# Patient Record
Sex: MALE | Race: OTHER | HISPANIC OR LATINO | URBAN - METROPOLITAN AREA
[De-identification: names, ages, dates, MRNs, and addresses within clinical notes are randomized per-mention and may not be internally consistent; named-entity substitution may affect disease eponyms.]

---

## 2019-09-25 ENCOUNTER — EMERGENCY (EMERGENCY)
Facility: HOSPITAL | Age: 24
LOS: 1 days | Discharge: HOME | End: 2019-09-25
Attending: EMERGENCY MEDICINE | Admitting: EMERGENCY MEDICINE
Payer: SELF-PAY

## 2019-09-25 VITALS
RESPIRATION RATE: 18 BRPM | HEART RATE: 96 BPM | OXYGEN SATURATION: 97 % | SYSTOLIC BLOOD PRESSURE: 125 MMHG | TEMPERATURE: 98 F | DIASTOLIC BLOOD PRESSURE: 67 MMHG

## 2019-09-25 VITALS
DIASTOLIC BLOOD PRESSURE: 68 MMHG | HEART RATE: 84 BPM | RESPIRATION RATE: 18 BRPM | TEMPERATURE: 98 F | OXYGEN SATURATION: 98 % | SYSTOLIC BLOOD PRESSURE: 122 MMHG

## 2019-09-25 PROCEDURE — 71101 X-RAY EXAM UNILAT RIBS/CHEST: CPT | Mod: 26,LT

## 2019-09-25 PROCEDURE — 71101 X-RAY EXAM UNILAT RIBS/CHEST: CPT

## 2019-09-25 PROCEDURE — 99285 EMERGENCY DEPT VISIT HI MDM: CPT

## 2019-09-25 PROCEDURE — 82962 GLUCOSE BLOOD TEST: CPT

## 2019-09-25 PROCEDURE — 99284 EMERGENCY DEPT VISIT MOD MDM: CPT

## 2019-09-25 RX ORDER — ACETAMINOPHEN 500 MG
975 TABLET ORAL ONCE
Refills: 0 | Status: COMPLETED | OUTPATIENT
Start: 2019-09-25 | End: 2019-09-25

## 2019-09-25 RX ADMIN — Medication 975 MILLIGRAM(S): at 18:39

## 2019-09-25 RX ADMIN — Medication 975 MILLIGRAM(S): at 17:56
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
## 2019-09-25 NOTE — ED ADULT NURSE REASSESSMENT NOTE - NS ED NURSE REASSESS COMMENT FT1
MISAEL Landaverde at bedside for re-evaluation, pt. asking for belongings, retrieved and given to pt., pt. getting dressed

## 2019-09-25 NOTE — ED ADULT NURSE NOTE - NSFALLRSKASSESSTYPE_ED_ALL_ED
Patient notified.     24 hour urine test canceled.     He has not been taking Triam/HCTZ. Removed from med list and added to allergy list.    Initial (On Arrival)
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
## 2019-09-25 NOTE — ED ADULT NURSE REASSESSMENT NOTE - NS ED NURSE REASSESS COMMENT FT1
pt. ambulating about ED, with steady gait, walked to restroom, provider notified, dispo. in progress

## 2019-09-25 NOTE — ED PROVIDER NOTE - NSFOLLOWUPINSTRUCTIONS_ED_ALL_ED_FT
Alcohol Intoxication  Alcohol intoxication occurs when a person no longer thinks clearly or functions well (becomes impaired) after drinking alcohol. Intoxication can occur with just one drink. The legal definition of alcohol intoxication depends on the amount of alcohol in the blood (blood alcohol concentration, FARZANA). FARZANA of 80–100 mg/dL or higher is commonly considered legally intoxicated. The level of impairment depends on:  The amount of alcohol the person had.  The person's age, gender, and weight.  How often the person drinks.  Whether the person has other medical conditions, such as diabetes, seizures, or a heart condition.  Alcohol intoxication can range from mild to severe. The condition can be dangerous, especially if the person:  Also took certain drugs or prescription medicines.  Drinks a large amount of alcohol in a short period of time (binge-drinks).  For women, binge-drinking is having four or more drinks at one time.  For men, binge-drinking is having five or more drinks at one time.  What are the causes?  This condition is caused by drinking alcohol.    What are the signs or symptoms?  Alcohol affects everyone differently. Symptoms of alcohol intoxication can vary among people.    Symptoms of mild alcohol intoxication may include:  Feeling relaxed or sleepy.  Having mild difficulty with coordination, speech, memory, or attention.  Symptoms of moderate alcohol intoxication may include:  Extreme emotions, like anger or sadness.  Moderate difficulty with coordination, speech, memory, or attention.  Symptoms of severe alcohol intoxication may include:  Severe difficulty with coordination, speech, memory, or attention.  Passing out.  Vomiting.  Confusion.  Slow breathing.  Coma.  Intoxication can change quickly from mild to severe. It can cause coma or death, especially in people who are not exposed to alcohol often.    How is this diagnosed?  Your health care provider will ask you how much alcohol you drank and what kind you had. Intoxication may also be diagnosed based on:  Your symptoms and medical history.  A physical exam.  A blood test that measures FARZANA.  Whether there is a smell of alcohol on your breath.  How is this treated?  Treatment for alcohol intoxication may include:  Being monitored in an emergency department, hospital, or treatment center until your blood alcohol level starts to lower and it is safe for you to go home.  IV fluids to prevent or treat dehydration.  Medicine to treat nausea or vomiting.  Medicine to help your body get rid of the alcohol.  A short counseling session (brief intervention) about the dangers of using alcohol excessively.  Working with (referral to) a counselor or specialist for treatment of substance use disorder.   Substance use disorder is a condition in which repeated use of drugs or alcohol causes problems with mental and physical health, affects relationships, or affects one's ability to meet responsibilities at work, home, or school.  Oxygen therapy or a breathing machine (ventilator), if you are not breathing adequately.  Long-term (chronic) exposure to alcohol can have long-term effects on your brain, heart, and gastrointestinal system. These effects can be serious and may also require treatment.    Follow these instructions at home:  Image   Eating and drinking     Image   Ask your health care provider if alcohol is safe for you.  If your health care provider says that alcohol is safe for you, limit alcohol intake to no more than 1 drink a day for nonpregnant women and 2 drinks a day for men. One drink equals 12 oz of beer, 5 oz of wine, or 1½ oz of hard liquor.  Do not drink alcohol if:  You are under the legal drinking age (21 years in the [place]  You are pregnant or may be pregnant.  You are taking medicines that should not be taken with alcohol.  Alcohol causes your medical condition to get worse.  You need to drive or perform activities that require your attention.  You have substance use disorder.  Avoid drinking alcohol on an empty stomach.  Stay hydrated. Drink enough fluid to keep your urine pale yellow. Avoid caffeine because it can dehydrate you.  Avoid drinking more than one drink per hour.  When having multiple drinks, drink water or a non-alcoholic beverage between alcoholic drinks.  General instructions     Do not drive after drinking any amount of alcohol. Plan for a designated  or another way to go home.  Have someone responsible stay with you while you are intoxicated. You should not be left alone.  Take over-the-counter and prescription medicines only as told by your health care provider.  Contact a health care provider if:  You do not feel better after a few days.  You have problems at work, at school, or at home due to drinking.  Get help right away if:  You have any of the following:  Moderate to severe trouble with coordination, speech, memory, or attention.  Trouble staying awake.  Severe confusion.  A seizure.  Light-headedness.  Fainting.  Vomiting bright red blood or material that looks like coffee grounds.  Bloody stool (feces). The blood may be bright red, or it may make stool look black and tarry and make it smell bad.  Shakiness when trying to stop drinking.  Thoughts about hurting yourself or others.  If you or anyone around you appears intoxicated, speak up and act.    If you ever feel like you may hurt yourself or others, or have thoughts about taking your own life, get help right away. You can go to your nearest emergency department or call:   Your local emergency services (911 in the [place]   A suicide crisis helpline, such as the National Suicide Prevention Lifeline at 1-110.315.5624. This is open 24 hours a day.   Summary  Alcohol intoxication occurs when a person no longer thinks clearly or functions well (becomes impaired) after drinking alcohol.  If your health care provider says that alcohol is safe for you, limit alcohol intake to no more than 1 drink a day for women (no drinks if you are pregnant) and 2 drinks a day for men. One drink equals 12 oz of beer, 5 oz of wine, or 1½ oz of hard liquor.  Contact your health care provider if you are having problems at work, at school, or at home due to drinking.   Get help right away if you have thoughts about hurting yourself or others.  This information is not intended to replace advice given to you by your health care provider. Make sure you discuss any questions you have with your health care provider.    Document Released: 09/27/2006 Document Revised: 10/03/2018 Document Reviewed: 10/03/2018  Elsevier Interactive Patient Education © 2019 [employer]

## 2019-09-25 NOTE — ED PROVIDER NOTE - SKIN, MLM
Left 8th rib interior mid axillary pain. Tenderness to palpation. No ecchymosis, no swelling, no abrasion, no discoloration Left 8th rib interior mid axillary pain. Mild Tenderness to palpation. No ecchymosis, no swelling, no abrasion, no discoloration

## 2019-09-25 NOTE — ED PROVIDER NOTE - PROGRESS NOTE DETAILS
Saima: patient has no complaints.  alert and oriented x 3, speech clear, gait steady.  Patient requesting DC and asking to use phone to call his family.

## 2019-09-25 NOTE — ED PROVIDER NOTE - PATIENT PORTAL LINK FT
You can access the FollowMyHealth Patient Portal offered by [facility] by registering at the following website: [email or web address]. By joining [employer] [facility]’s FollowMyHealth portal, you will also be able to view your health information using other applications (apps) compatible with our system.

## 2019-09-25 NOTE — ED ADULT NURSE NOTE - NSIMPLEMENTINTERV_GEN_ALL_ED
Implemented All Fall Risk Interventions:  [place] to call system. Call bell, personal items and telephone within reach. Instruct patient to call for assistance. Room bathroom lighting operational. Non-slip footwear when patient is off stretcher. Physically safe environment: no spills, clutter or unnecessary equipment. Stretcher in lowest position, wheels locked, appropriate side rails in place. Provide visual cue, wrist band, yellow gown, etc. Monitor gait and stability. Monitor for mental status changes and reorient to person, place, and time. Review medications for side effects contributing to fall risk. Reinforce activity limits and safety measures with patient and family.

## 2019-09-30 DIAGNOSIS — R07.81 PLEURODYNIA: ICD-10-CM

## 2019-09-30 DIAGNOSIS — R41.82 ALTERED MENTAL STATUS, UNSPECIFIED: ICD-10-CM

## 2019-09-30 DIAGNOSIS — F10.129 ALCOHOL ABUSE WITH INTOXICATION, UNSPECIFIED: ICD-10-CM

## 2025-02-25 NOTE — ED ADULT TRIAGE NOTE - NS ED NURSE BANDS TYPE
Name band; Quality 226: Preventive Care And Screening: Tobacco Use: Screening And Cessation Intervention: Patient screened for tobacco use and is an ex/non-smoker Quality 47: Advance Care Plan: Advance Care Planning discussed and documented; advance care plan or surrogate decision maker documented in the medical record. Detail Level: Detailed Quality 130: Documentation Of Current Medications In The Medical Record: Current Medications Documented